# Patient Record
Sex: FEMALE | URBAN - METROPOLITAN AREA
[De-identification: names, ages, dates, MRNs, and addresses within clinical notes are randomized per-mention and may not be internally consistent; named-entity substitution may affect disease eponyms.]

---

## 2024-03-07 ENCOUNTER — OV CON (OUTPATIENT)
Dept: URBAN - METROPOLITAN AREA CLINIC 63 | Facility: CLINIC | Age: 59
End: 2024-03-07
Payer: COMMERCIAL

## 2024-03-07 VITALS
BODY MASS INDEX: 33.18 KG/M2 | HEIGHT: 60 IN | HEART RATE: 74 BPM | WEIGHT: 169 LBS | SYSTOLIC BLOOD PRESSURE: 122 MMHG | DIASTOLIC BLOOD PRESSURE: 74 MMHG | OXYGEN SATURATION: 98 % | TEMPERATURE: 97.2 F

## 2024-03-07 DIAGNOSIS — Z87.19 HISTORY OF BARRETT'S ESOPHAGUS: ICD-10-CM

## 2024-03-07 DIAGNOSIS — Z12.11 COLON CANCER SCREENING: ICD-10-CM

## 2024-03-07 PROCEDURE — 99203 OFFICE O/P NEW LOW 30 MIN: CPT

## 2024-03-07 RX ORDER — OMEPRAZOLE 40 MG/1
1 CAPSULE 30 MINUTES BEFORE MORNING MEAL CAPSULE, DELAYED RELEASE ORAL ONCE A DAY
Qty: 30 | OUTPATIENT
Start: 2024-03-07

## 2024-03-07 RX ORDER — LEVOTHYROXINE SODIUM 100 UG/1
1 TABLET IN THE MORNING ON AN EMPTY STOMACH TABLET ORAL ONCE A DAY
Status: ACTIVE | COMMUNITY

## 2024-03-07 RX ORDER — MELOXICAM 10 MG/1
1 CAPSULE CAPSULE ORAL ONCE A DAY
Status: ACTIVE | COMMUNITY

## 2024-03-07 RX ORDER — HYDROCHLOROTHIAZIDE 12.5 MG/1
1 CAPSULE IN THE MORNING CAPSULE, GELATIN COATED ORAL ONCE A DAY
Status: ACTIVE | COMMUNITY

## 2024-03-07 NOTE — PHYSICAL EXAM CONSTITUTIONAL:
well developed,overweight, in no acute distress , ambulating without difficulty , normal communication ability

## 2024-03-07 NOTE — HPI-ZZZTODAY'S VISIT
Boyd is a very pleasant 58-year-old female who presents for GERD.  She is new patient to our practice from establishing with Dr. Angeles today.  Past medical history significant for hypertension, thyroid cancer (2011) hypothyroidism, prediabetes.  Past surgical history significant for no recent procedures.  Last colonoscopy 2022. Recall 3 years. Last endoscopy before 2010. Patient reports bay's esophagus seen on EGD but no records for review She denies a family history of colon polyps or GI malignancy  Sonam presents for a chief complaint of episodic GERD. She has episodes lasting for a few days to weeks when she will develop a cough and acid burning. This has been happening more often recently leading her to come in for further evaluation. She uses omeprazole 40 mg when she has an episode with resolution of symptoms. She denies dysphagia, dyspepsia, pyrosis, abdominal pain, change in bowel habits, unintentional weight loss, melena or hematochezia.

## 2024-03-14 ENCOUNTER — LAB (OUTPATIENT)
Dept: URBAN - METROPOLITAN AREA CLINIC 63 | Facility: CLINIC | Age: 59
End: 2024-03-14

## 2024-04-15 ENCOUNTER — EGD (OUTPATIENT)
Dept: URBAN - METROPOLITAN AREA SURGERY CENTER 4 | Facility: SURGERY CENTER | Age: 59
End: 2024-04-15
Payer: COMMERCIAL

## 2024-04-15 DIAGNOSIS — Z98.84 HISTORY OF GASTRIC BYPASS: ICD-10-CM

## 2024-04-15 DIAGNOSIS — K20.90 ESOPHAGITIS, UNSPECIFIED WITHOUT BLEEDING: ICD-10-CM

## 2024-04-15 DIAGNOSIS — K44.9 DIAPHRAGMATIC HERNIA WITHOUT OBSTRUCTION OR GANGRENE: ICD-10-CM

## 2024-04-15 PROCEDURE — 43239 EGD BIOPSY SINGLE/MULTIPLE: CPT | Performed by: INTERNAL MEDICINE

## 2024-04-15 RX ORDER — MELOXICAM 10 MG/1
1 CAPSULE CAPSULE ORAL ONCE A DAY
Status: ACTIVE | COMMUNITY

## 2024-04-15 RX ORDER — HYDROCHLOROTHIAZIDE 12.5 MG/1
1 CAPSULE IN THE MORNING CAPSULE, GELATIN COATED ORAL ONCE A DAY
Status: ACTIVE | COMMUNITY

## 2024-04-15 RX ORDER — LEVOTHYROXINE SODIUM 100 UG/1
1 TABLET IN THE MORNING ON AN EMPTY STOMACH TABLET ORAL ONCE A DAY
Status: ACTIVE | COMMUNITY

## 2024-04-15 RX ORDER — OMEPRAZOLE 40 MG/1
1 CAPSULE 30 MINUTES BEFORE MORNING MEAL CAPSULE, DELAYED RELEASE ORAL ONCE A DAY
Qty: 30 | Status: ACTIVE | COMMUNITY
Start: 2024-03-07

## 2024-05-02 ENCOUNTER — OFFICE VISIT (OUTPATIENT)
Dept: URBAN - METROPOLITAN AREA CLINIC 63 | Facility: CLINIC | Age: 59
End: 2024-05-02

## 2024-07-11 ENCOUNTER — OFFICE VISIT (OUTPATIENT)
Dept: URBAN - METROPOLITAN AREA CLINIC 63 | Facility: CLINIC | Age: 59
End: 2024-07-11
Payer: COMMERCIAL

## 2024-07-11 ENCOUNTER — DASHBOARD ENCOUNTERS (OUTPATIENT)
Age: 59
End: 2024-07-11

## 2024-07-11 VITALS
OXYGEN SATURATION: 95 % | HEIGHT: 60 IN | SYSTOLIC BLOOD PRESSURE: 120 MMHG | HEART RATE: 70 BPM | DIASTOLIC BLOOD PRESSURE: 72 MMHG | WEIGHT: 158 LBS | TEMPERATURE: 97.6 F | BODY MASS INDEX: 31.02 KG/M2

## 2024-07-11 DIAGNOSIS — K21.9 GERD WITHOUT ESOPHAGITIS: ICD-10-CM

## 2024-07-11 DIAGNOSIS — Z12.11 COLON CANCER SCREENING: ICD-10-CM

## 2024-07-11 PROBLEM — 305058001: Status: ACTIVE | Noted: 2024-07-11

## 2024-07-11 PROBLEM — 266435005: Status: ACTIVE | Noted: 2024-07-11

## 2024-07-11 PROCEDURE — 99214 OFFICE O/P EST MOD 30 MIN: CPT

## 2024-07-11 RX ORDER — MELOXICAM 10 MG/1
1 CAPSULE CAPSULE ORAL ONCE A DAY
Status: ACTIVE | COMMUNITY

## 2024-07-11 RX ORDER — HYDROCHLOROTHIAZIDE 12.5 MG/1
1 CAPSULE IN THE MORNING CAPSULE, GELATIN COATED ORAL ONCE A DAY
Status: ACTIVE | COMMUNITY

## 2024-07-11 RX ORDER — LEVOTHYROXINE SODIUM 100 UG/1
1 TABLET IN THE MORNING ON AN EMPTY STOMACH TABLET ORAL ONCE A DAY
Status: ACTIVE | COMMUNITY

## 2024-07-11 RX ORDER — OMEPRAZOLE 40 MG/1
1 CAPSULE 30 MINUTES BEFORE MORNING MEAL CAPSULE, DELAYED RELEASE ORAL ONCE A DAY
Qty: 90 | Refills: 1 | OUTPATIENT
Start: 2024-07-11

## 2024-07-11 RX ORDER — OMEPRAZOLE 40 MG/1
TAKE 1 CAPSULE BY MOUTH DAILY 30 MINUTES BEFORE BREAKFAST CAPSULE, DELAYED RELEASE ORAL
Qty: 30 CAPSULE | Refills: 0 | Status: ACTIVE | COMMUNITY

## 2024-07-11 NOTE — HPI-ZZZTODAY'S VISIT
Boyd is a very pleasant 58-year-old female who presents for GERD.  She is new patient to our practice from establishing with Dr. Angeles today.  Past medical history significant for hypertension, thyroid cancer (2011) hypothyroidism, prediabetes.  Past surgical history significant for no recent procedures.  Last colonoscopy 2022. Recall 3 years. Last endoscopy before 2010. Patient reports bay's esophagus seen on EGD but no records for review She denies a family history of colon polyps or GI malignancy. . At the last visit, she presented for a chief complaint of episodic GERD, with a cough and acid burning. This has been happening more often recently leading her to take an Omeprazole 40mg, which resolves her symptoms. Patient is here for a follow-up of her EGD.  Patient present's at today's visit having no complaints from a GI perspective. She does admit to smoking 1/4 to 1/2 pack cigarette's a day. She acknowledges and knows this is worsening her symptoms. She was abstaining from tobacco use for 28 years until she recently restarted.  She will attempt to stop again in the future. . She is unsure when he last CRC screening colonoscopy was but explains she has the records at home and will them to us. We will rediscuss screening colonoscopy at her next visit in 6 months. . Patient denies abdominal pain, belching, bloating, constipation, diarrhea, dysphagia, melena, hematochezia, hematemesis, BRPBR, nausea, vomiting.

## 2024-07-11 NOTE — HPI-PREVIOUS IMAGING
EGD, 4/15/2024, Dr. Angeles - Mildly severe reflux esophagitis with no bleeding.  Rule out Madrigal's esophagus.  Biopsied. - 1 cm hiatal hernia. - Gastric biopsy with a medium sized pouch and intact staple line.  Gastrojejunal anastomosis characterized by ulceration. - Normal examined jejunum. . EGD pathology report, 4/15/2024 - Biopsy of the body and antrum; fragments of a benign gastric mucosa without significant inflammation.  Immunostain is negative for he will back to pylori organism fragments of benign villous duodenal type mucosa without significant inflammation. - Gastroesophageal junction biopsy; squamocolumnar mucosa with mild reflux type changes and mild chronic inflammation of columnar gastric-type mucosa.  No evidence of intestinal metaplasia or fungus seen on stain.  No evidence of dysplasia.

## 2024-10-02 ENCOUNTER — TELEPHONE ENCOUNTER (OUTPATIENT)
Dept: URBAN - METROPOLITAN AREA CLINIC 63 | Facility: CLINIC | Age: 59
End: 2024-10-02

## 2024-12-05 ENCOUNTER — TELEPHONE ENCOUNTER (OUTPATIENT)
Dept: URBAN - METROPOLITAN AREA CLINIC 63 | Facility: CLINIC | Age: 59
End: 2024-12-05